# Patient Record
Sex: MALE | Race: BLACK OR AFRICAN AMERICAN | NOT HISPANIC OR LATINO | Employment: FULL TIME | URBAN - METROPOLITAN AREA
[De-identification: names, ages, dates, MRNs, and addresses within clinical notes are randomized per-mention and may not be internally consistent; named-entity substitution may affect disease eponyms.]

---

## 2018-08-28 ENCOUNTER — HISTORICAL (OUTPATIENT)
Dept: ADMINISTRATIVE | Facility: HOSPITAL | Age: 23
End: 2018-08-28

## 2022-12-11 ENCOUNTER — HOSPITAL ENCOUNTER (EMERGENCY)
Facility: HOSPITAL | Age: 27
Discharge: HOME | End: 2022-12-11
Attending: STUDENT IN AN ORGANIZED HEALTH CARE EDUCATION/TRAINING PROGRAM
Payer: MEDICAID

## 2022-12-11 VITALS
WEIGHT: 235 LBS | DIASTOLIC BLOOD PRESSURE: 81 MMHG | HEIGHT: 66 IN | HEART RATE: 60 BPM | RESPIRATION RATE: 20 BRPM | BODY MASS INDEX: 37.77 KG/M2 | SYSTOLIC BLOOD PRESSURE: 133 MMHG | TEMPERATURE: 98 F | OXYGEN SATURATION: 98 %

## 2022-12-11 DIAGNOSIS — H69.93 EUSTACHIAN TUBE DYSFUNCTION, BILATERAL: Primary | ICD-10-CM

## 2022-12-11 PROCEDURE — 99283 EMERGENCY DEPT VISIT LOW MDM: CPT | Mod: 25

## 2022-12-11 RX ORDER — FLUTICASONE PROPIONATE 50 MCG
1 SPRAY, SUSPENSION (ML) NASAL DAILY
Qty: 15 G | Refills: 0 | Status: SHIPPED | OUTPATIENT
Start: 2022-12-11 | End: 2023-06-01 | Stop reason: SDUPTHER

## 2022-12-11 RX ORDER — CETIRIZINE HYDROCHLORIDE 10 MG/1
10 TABLET ORAL DAILY
Qty: 30 TABLET | Refills: 0 | OUTPATIENT
Start: 2022-12-11 | End: 2023-06-01

## 2022-12-11 NOTE — ED PROVIDER NOTES
Encounter Date: 12/11/2022       History     Chief Complaint   Patient presents with    Otalgia     Ear pain for two weeks     HPI    27-year-old male with no known past medical history presents emergency department for ear pain.  Patient states he has been having intermittent ear pain and ear fullness for last 2 weeks.  States he feels like he is in a bubble.  States he tried some wax drops pharmacy with no improvement.  No fevers.  Mild sinus congestion from seasonal allergies.  No cough.    Review of patient's allergies indicates:  No Known Allergies  History reviewed. No pertinent past medical history.  History reviewed. No pertinent surgical history.  History reviewed. No pertinent family history.     Review of Systems   Constitutional:  Negative for fever.   HENT:  Positive for congestion, ear pain and postnasal drip.    Respiratory:  Negative for cough.    Cardiovascular:  Negative for chest pain.   Gastrointestinal:  Negative for abdominal pain.   Neurological:  Positive for headaches.   All other systems reviewed and are negative.    Physical Exam     Initial Vitals [12/11/22 0636]   BP Pulse Resp Temp SpO2   133/81 60 20 98.2 °F (36.8 °C) 98 %      MAP       --         Physical Exam    Nursing note and vitals reviewed.  Constitutional: He appears well-developed and well-nourished. No distress.   HENT:   Mouth/Throat: Oropharynx is clear and moist.   Bilateral middle ear effusion with no erythema of TM   Cardiovascular:  Normal rate, regular rhythm and intact distal pulses.           Pulmonary/Chest: Breath sounds normal. No respiratory distress. He has no wheezes. He has no rhonchi.   Abdominal: Abdomen is soft. Bowel sounds are normal. There is no abdominal tenderness.   Musculoskeletal:         General: Normal range of motion.     Neurological: He is alert and oriented to person, place, and time. GCS score is 15. GCS eye subscore is 4. GCS verbal subscore is 5. GCS motor subscore is 6.   Skin: Skin is  warm. Capillary refill takes less than 2 seconds.   Psychiatric: He has a normal mood and affect. Thought content normal.       ED Course   Procedures  Labs Reviewed - No data to display       Imaging Results    None          Medications - No data to display  Medical Decision Making:   Differential Diagnosis:   Otitis media, otitis externa, middle ear effusion, foreign body ear, eustachian tube dysfunction                        Clinical Impression:   Final diagnoses:  [H69.83] Eustachian tube dysfunction, bilateral (Primary)        ED Disposition Condition    Discharge Stable          ED Prescriptions       Medication Sig Dispense Start Date End Date Auth. Provider    fluticasone propionate (FLONASE) 50 mcg/actuation nasal spray 1 spray (50 mcg total) by Each Nostril route once daily. 15 g 12/11/2022 -- Uriel Villalpando MD    cetirizine (ZYRTEC) 10 MG tablet Take 1 tablet (10 mg total) by mouth once daily. 30 tablet 12/11/2022 12/11/2023 Uriel Villalpando MD          Follow-up Information       Follow up With Specialties Details Why Contact Info    [facility] - Emergency Dept Emergency Medicine Go to  If symptoms worsen [id number]  [facility] [id number] [phone]             Uriel Villalpando MD  12/11/22 2252

## 2023-06-01 ENCOUNTER — HOSPITAL ENCOUNTER (EMERGENCY)
Facility: HOSPITAL | Age: 28
Discharge: HOME | End: 2023-06-01
Attending: EMERGENCY MEDICINE
Payer: MEDICAID

## 2023-06-01 VITALS
WEIGHT: 213 LBS | TEMPERATURE: 98 F | SYSTOLIC BLOOD PRESSURE: 167 MMHG | BODY MASS INDEX: 35.49 KG/M2 | DIASTOLIC BLOOD PRESSURE: 98 MMHG | RESPIRATION RATE: 20 BRPM | OXYGEN SATURATION: 98 % | HEART RATE: 107 BPM | HEIGHT: 65 IN

## 2023-06-01 DIAGNOSIS — H65.191 ACUTE MIDDLE EAR EFFUSION, RIGHT: Primary | ICD-10-CM

## 2023-06-01 PROCEDURE — 25000003 PHARM REV CODE 250: Performed by: PHYSICIAN ASSISTANT

## 2023-06-01 PROCEDURE — 99284 EMERGENCY DEPT VISIT MOD MDM: CPT

## 2023-06-01 RX ORDER — FLUTICASONE PROPIONATE 50 MCG
1 SPRAY, SUSPENSION (ML) NASAL DAILY
Qty: 15 G | Refills: 0 | Status: SHIPPED | OUTPATIENT
Start: 2023-06-01

## 2023-06-01 RX ORDER — CETIRIZINE HYDROCHLORIDE 1 MG/ML
10 SOLUTION ORAL DAILY
Qty: 300 ML | Refills: 0 | Status: SHIPPED | OUTPATIENT
Start: 2023-06-01 | End: 2023-07-01

## 2023-06-01 RX ORDER — TRIPROLIDINE/PSEUDOEPHEDRINE 2.5MG-60MG
600 TABLET ORAL
Status: COMPLETED | OUTPATIENT
Start: 2023-06-01 | End: 2023-06-01

## 2023-06-01 RX ORDER — IBUPROFEN 600 MG/1
600 TABLET ORAL EVERY 6 HOURS PRN
Qty: 20 TABLET | Refills: 0 | Status: SHIPPED | OUTPATIENT
Start: 2023-06-01

## 2023-06-01 RX ADMIN — IBUPROFEN 600 MG: 100 SUSPENSION ORAL at 06:06

## 2023-06-01 NOTE — DISCHARGE INSTRUCTIONS
Hydrate with plenty of water. Tylenol and ibuprofen in rotation for fever/aches. Use allergy medication and Flonase daily. May use over the counter cough syrup.

## 2023-06-01 NOTE — Clinical Note
"Jerry"Juanita Suarez was seen and treated in our emergency department on 6/1/2023.  He may return to work on 06/03/2023.       If you have any questions or concerns, please don't hesitate to call.      ZANDRA Gardner"

## 2023-06-01 NOTE — ED PROVIDER NOTES
Encounter Date: 6/1/2023       History     Chief Complaint   Patient presents with    Otalgia     Pt to er c/o pain to right ear onset this morning.     27-year-old male with no known past medical history presents emergency department for ear pain.  Patient states he has been having intermittent ear pain and ear fullness since 0900 this morning.  States he feels like he is in a bubble and his ear has been popping. Intermittent cough. Denies any fever or drainage.     The history is provided by the patient. No  was used.   Review of patient's allergies indicates:  No Known Allergies  History reviewed. No pertinent past medical history.  Past Surgical History:   Procedure Laterality Date    tubes in ears Bilateral      No family history on file.     Review of Systems   Constitutional:  Negative for fatigue and fever.   HENT:  Positive for ear pain. Negative for congestion, rhinorrhea and sore throat.    Respiratory:  Positive for cough. Negative for shortness of breath and wheezing.    Cardiovascular:  Negative for chest pain.   Gastrointestinal:  Negative for abdominal pain, nausea and vomiting.   Musculoskeletal:  Negative for myalgias.   Neurological:  Negative for headaches.   All other systems reviewed and are negative.    Physical Exam     Initial Vitals [06/01/23 1723]   BP Pulse Resp Temp SpO2   (!) 167/98 107 20 97.7 °F (36.5 °C) 98 %      MAP       --         Physical Exam    Nursing note and vitals reviewed.  Constitutional: He appears well-developed. He is cooperative.   HENT:   Head: Normocephalic and atraumatic.   Right Ear: External ear and ear canal normal. Tympanic membrane is not injected, not perforated, not erythematous and not bulging. A middle ear effusion is present.   Left Ear: Tympanic membrane, external ear and ear canal normal.   Mouth/Throat: Uvula is midline, oropharynx is clear and moist and mucous membranes are normal. No trismus in the jaw. No uvula swelling. No  oropharyngeal exudate, posterior oropharyngeal edema or posterior oropharyngeal erythema.   Eyes: Conjunctivae are normal. Pupils are equal, round, and reactive to light.   Neck: Neck supple.   Normal range of motion.  Cardiovascular:  Normal rate, regular rhythm and normal heart sounds.           Pulmonary/Chest: Breath sounds normal. No respiratory distress. He has no wheezes. He has no rhonchi. He has no rales.   Abdominal: Abdomen is soft. Bowel sounds are normal. There is no abdominal tenderness.   Musculoskeletal:         General: Normal range of motion.      Cervical back: Normal range of motion and neck supple.     Neurological: He is alert and oriented to person, place, and time.   Skin: Skin is warm and dry. Capillary refill takes less than 2 seconds.   Psychiatric: He has a normal mood and affect.       ED Course   Procedures  Labs Reviewed - No data to display       Imaging Results    None          Medications   ibuprofen 20 mg/mL oral liquid 600 mg (has no administration in time range)     Medical Decision Making:   Initial Assessment:   27-year-old male with no known past medical history presents emergency department for ear pain.  Patient states he has been having intermittent ear pain and ear fullness since 0900 this morning.  States he feels like he is in a bubble and his ear has been popping. Intermittent cough. Denies any fever or drainage  Differential Diagnosis:   Viral syndrome, COVID, flu, sinusitis, OM, OE, allergies    ED Management:  Patient afebrile and in no acute distress.  Presents to ED for evaluation of right ear pain and feeling like he was in a bubble.  Reports intermittent cough.  On exam patient with right middle ear effusion without any signs of infection.  Discussed possible viral in nature.  Offered patient COVID/flu testing due to cough.  Patient declined at this time stating that he can not wait for results.  Will treat with short course of allergy medication, NSAIDs and  Flonase.  Return ED precautions given.  Patient verbalizes understanding and agrees with plan of care.                        Clinical Impression:   Final diagnoses:  [H65.191] Acute middle ear effusion, right (Primary)        ED Disposition Condition    Discharge Stable          ED Prescriptions       Medication Sig Dispense Start Date End Date Auth. Provider    fluticasone propionate (FLONASE) 50 mcg/actuation nasal spray 1 spray (50 mcg total) by Each Nostril route once daily. 15 g 6/1/2023 -- ZANDRA Gardner    cetirizine (ZYRTEC) 1 mg/mL syrup Take 10 mLs (10 mg total) by mouth once daily. 300 mL 6/1/2023 7/1/2023 ZANDRA Gardner    ibuprofen (ADVIL,MOTRIN) 600 MG tablet Take 1 tablet (600 mg total) by mouth every 6 (six) hours as needed for Pain. 20 tablet 6/1/2023 -- ZANDRA Gardner          Follow-up Information       Follow up With Specialties Details Why Contact Info    PCP  In 1 week As needed, If you do not have a PCP you may call [phone] to help get set up If you do not have a PCP you may call [phone] to help get set up.             ZANDRA Gardner  06/01/23 9743

## 2024-06-22 ENCOUNTER — HOSPITAL ENCOUNTER (EMERGENCY)
Facility: HOSPITAL | Age: 29
Discharge: HOME | End: 2024-06-22
Attending: EMERGENCY MEDICINE

## 2024-06-22 VITALS
HEART RATE: 65 BPM | OXYGEN SATURATION: 97 % | WEIGHT: 210 LBS | TEMPERATURE: 98 F | SYSTOLIC BLOOD PRESSURE: 130 MMHG | HEIGHT: 64 IN | DIASTOLIC BLOOD PRESSURE: 84 MMHG | RESPIRATION RATE: 18 BRPM | BODY MASS INDEX: 35.85 KG/M2

## 2024-06-22 DIAGNOSIS — J32.9 SINUSITIS, UNSPECIFIED CHRONICITY, UNSPECIFIED LOCATION: ICD-10-CM

## 2024-06-22 DIAGNOSIS — J06.9 VIRAL URI WITH COUGH: ICD-10-CM

## 2024-06-22 DIAGNOSIS — H66.91 RIGHT OTITIS MEDIA, UNSPECIFIED OTITIS MEDIA TYPE: Primary | ICD-10-CM

## 2024-06-22 LAB
FLUAV AG UPPER RESP QL IA.RAPID: NOT DETECTED
FLUBV AG UPPER RESP QL IA.RAPID: NOT DETECTED
SARS-COV-2 RNA RESP QL NAA+PROBE: NOT DETECTED
STREP A PCR (OHS): NOT DETECTED

## 2024-06-22 PROCEDURE — 0240U COVID/FLU A&B PCR: CPT | Performed by: NURSE PRACTITIONER

## 2024-06-22 PROCEDURE — 99284 EMERGENCY DEPT VISIT MOD MDM: CPT | Mod: 25

## 2024-06-22 PROCEDURE — 63600175 PHARM REV CODE 636 W HCPCS: Performed by: NURSE PRACTITIONER

## 2024-06-22 PROCEDURE — 87651 STREP A DNA AMP PROBE: CPT | Performed by: NURSE PRACTITIONER

## 2024-06-22 PROCEDURE — 96372 THER/PROPH/DIAG INJ SC/IM: CPT | Performed by: NURSE PRACTITIONER

## 2024-06-22 RX ORDER — AMOXICILLIN AND CLAVULANATE POTASSIUM 400; 57 MG/5ML; MG/5ML
875 POWDER, FOR SUSPENSION ORAL 2 TIMES DAILY
Qty: 220 ML | Refills: 0 | Status: SHIPPED | OUTPATIENT
Start: 2024-06-22 | End: 2024-07-02

## 2024-06-22 RX ORDER — DEXAMETHASONE SODIUM PHOSPHATE 4 MG/ML
8 INJECTION, SOLUTION INTRA-ARTICULAR; INTRALESIONAL; INTRAMUSCULAR; INTRAVENOUS; SOFT TISSUE
Status: COMPLETED | OUTPATIENT
Start: 2024-06-22 | End: 2024-06-22

## 2024-06-22 RX ORDER — CETIRIZINE HYDROCHLORIDE 1 MG/ML
10 SOLUTION ORAL DAILY
Qty: 100 ML | Refills: 0 | Status: SHIPPED | OUTPATIENT
Start: 2024-06-22 | End: 2024-07-02

## 2024-06-22 RX ORDER — FLUTICASONE PROPIONATE 50 MCG
1 SPRAY, SUSPENSION (ML) NASAL 2 TIMES DAILY PRN
Qty: 15 G | Refills: 0 | Status: SHIPPED | OUTPATIENT
Start: 2024-06-22 | End: 2024-06-27

## 2024-06-22 RX ADMIN — DEXAMETHASONE SODIUM PHOSPHATE 8 MG: 4 INJECTION, SOLUTION INTRA-ARTICULAR; INTRALESIONAL; INTRAMUSCULAR; INTRAVENOUS; SOFT TISSUE at 05:06

## 2024-06-22 NOTE — ED PROVIDER NOTES
"Encounter Date: 6/22/2024       History     Chief Complaint   Patient presents with    Cough     Pt complaint of a cough not relieved with OTC for the past week (2) "popping with drainage" to both ears x 2 weeks     Patient states coughing, congestion, sore throat, and ear pain x 2 weeks. Denies any fever, ear drainage, SOB, wheezing, nausea, or vomiting. Denies any PMH.    The history is provided by the patient.   Sinusitis   This is a recurrent problem. Illness onset: 2 weeks. The problem has been unchanged. The pain has been Intermittent since onset. Associated symptoms include congestion, sinus pressure, sore throat and cough. Pertinent negatives include no chills.     Review of patient's allergies indicates:  No Known Allergies  No past medical history on file.  Past Surgical History:   Procedure Laterality Date    tubes in ears Bilateral      No family history on file.     Review of Systems   Constitutional: Negative.  Negative for chills and fever.   HENT:  Positive for congestion, rhinorrhea, sinus pressure and sore throat.    Eyes: Negative.    Respiratory:  Positive for cough.    Cardiovascular: Negative.    Gastrointestinal: Negative.    Endocrine: Negative.    Genitourinary: Negative.    Musculoskeletal: Negative.    Skin: Negative.    Allergic/Immunologic: Negative.    Neurological: Negative.    Hematological: Negative.    Psychiatric/Behavioral: Negative.     All other systems reviewed and are negative.      Physical Exam     Initial Vitals [06/22/24 1557]   BP Pulse Resp Temp SpO2   130/84 65 18 98.4 °F (36.9 °C) 97 %      MAP       --         Physical Exam    Nursing note and vitals reviewed.  Constitutional: He appears well-developed and well-nourished. No distress.   HENT:   Head: Normocephalic and atraumatic.   Right Ear: External ear and ear canal normal. No mastoid tenderness. Tympanic membrane is erythematous and bulging.   Left Ear: Tympanic membrane, external ear and ear canal normal. No " mastoid tenderness.   Mouth/Throat: Uvula is midline, oropharynx is clear and moist and mucous membranes are normal.   Eyes: Conjunctivae and EOM are normal. Pupils are equal, round, and reactive to light.   Neck: Neck supple.   Normal range of motion.  Cardiovascular:  Normal rate, regular rhythm, normal heart sounds and intact distal pulses.           Pulmonary/Chest: Breath sounds normal. No respiratory distress. He has no wheezes. He has no rales.   Abdominal: Abdomen is soft. Bowel sounds are normal. He exhibits no distension. There is no abdominal tenderness.   Musculoskeletal:         General: No edema. Normal range of motion.      Cervical back: Normal range of motion and neck supple.     Lymphadenopathy:     He has no cervical adenopathy.   Neurological: He is alert and oriented to person, place, and time. He has normal strength. Gait normal. GCS score is 15. GCS eye subscore is 4. GCS verbal subscore is 5. GCS motor subscore is 6.   Skin: Skin is warm and dry. No rash noted.   Psychiatric: He has a normal mood and affect. Thought content normal.         ED Course   Procedures  Labs Reviewed   COVID/FLU A&B PCR - Normal    Narrative:     The Xpert Xpress SARS-CoV-2/FLU/RSV plus is a rapid, multiplexed real-time PCR test intended for the simultaneous qualitative detection and differentiation of SARS-CoV-2, Influenza A, Influenza B, and respiratory syncytial virus (RSV) viral RNA in either nasopharyngeal swab or nasal swab specimens.         STREP GROUP A BY PCR - Normal    Narrative:     The Xpert Xpress Strep A test is a rapid, qualitative in vitro diagnostic test for the detection of Streptococcus pyogenes (Group A ß-hemolytic Streptococcus, Strep A) in throat swab specimens from patients with signs and symptoms of pharyngitis.            Imaging Results    None          Medications   dexAMETHasone injection 8 mg (has no administration in time range)     Medical Decision Making  Patient states coughing,  congestion, sore throat, and ear pain x 2 weeks. Denies any fever, ear drainage, SOB, wheezing, nausea, or vomiting. Denies any PMH.    The history is provided by the patient.   Sinusitis   This is a recurrent problem. Illness onset: 2 weeks. The problem has been unchanged. The pain has been Intermittent since onset. Associated symptoms include congestion, sinus pressure, sore throat and cough. Pertinent negatives include no chills.       Amount and/or Complexity of Data Reviewed  Labs: ordered. Decision-making details documented in ED Course.  Discussion of management or test interpretation with external provider(s): Differential diagnosis (including but not limited to):   Judging by the patient's chief complaint and pertinent history, the patient has the following possible differential diagnoses, including but not limited to the following.  Some of these are deemed to be lower likelihood and some more likely based on my physical exam and history combined with possible lab work and/or imaging studies.   Please see the pertinent studies, and refer to the HPI.  Some of these diagnoses will take further evaluation to fully rule out, perhaps as an outpatient and the patient was encouraged to follow up when discharged for more comprehensive evaluation.  Covid, Flu, Strep, URI, Viral Illness, Sinusitis, Otitis Media.   Patient is negative for Covid, Flu, and Strep in the ED. Will treat patient symptomatically for a Viral URI and will treat right otitis media with Augmentin. Patient was prescribed Augmentin for an ear infection x 2 weeks ago but he did not fill his prescription. ED return precautions given.                  ED Course as of 06/22/24 1707   Sat Jun 22, 2024   1641 Strep Group A by PCR [AB]   1641 STREP A PCR ([facility]): Not Detected [AB]   1657 COVID/FLU A&B PCR [AB]   1657 Influenza A, Molecular: Not Detected [AB]   1657 Influenza B, Molecular: Not Detected [AB]   1657 SARS-CoV2 (COVID-19) Qualitative PCR: Not  Detected [AB]      ED Course User Index  [AB] Naomi Berumen FNP                           Clinical Impression:  Final diagnoses:  [H66.91] Right otitis media, unspecified otitis media type (Primary)  [J32.9] Sinusitis, unspecified chronicity, unspecified location  [J06.9] Viral URI with cough          ED Disposition Condition    Discharge Stable          ED Prescriptions       Medication Sig Dispense Start Date End Date Auth. Provider    fluticasone propionate (FLONASE) 50 mcg/actuation nasal spray 1 spray (50 mcg total) by Each Nostril route 2 (two) times daily as needed for Rhinitis. 15 g 6/22/2024 6/27/2024 Naomi Berumen FNP    amoxicillin-clavulanate (AUGMENTIN) 400-57 mg/5 mL SusR Take 10.9 mLs (872 mg total) by mouth 2 (two) times daily. for 10 days 220 mL 6/22/2024 7/2/2024 Naomi Berumen FNP    cetirizine (ZYRTEC) 1 mg/mL syrup Take 10 mLs (10 mg total) by mouth once daily. for 10 days 100 mL 6/22/2024 7/2/2024 Naomi Berumen FNP          Follow-up Information       Follow up With Specialties Details Why Contact Info    Primary Care Provider  In 3 days      Please call [phone] to arrange a follow-up appointment with a Primary Care Provider.  In 3 days               Naomi Berumen FNP  06/22/24 5148

## 2024-06-22 NOTE — ED TRIAGE NOTES
"  Pt complaint of a cough not relieved with OTC for the past week (2) "popping with drainage" to both ears x 2 weeks  "

## 2025-01-09 ENCOUNTER — OFFICE VISIT (OUTPATIENT)
Dept: URGENT CARE | Facility: CLINIC | Age: 30
End: 2025-01-09
Payer: COMMERCIAL

## 2025-01-09 VITALS
OXYGEN SATURATION: 98 % | TEMPERATURE: 101 F | WEIGHT: 219 LBS | DIASTOLIC BLOOD PRESSURE: 86 MMHG | SYSTOLIC BLOOD PRESSURE: 135 MMHG | HEART RATE: 117 BPM | BODY MASS INDEX: 37.39 KG/M2 | RESPIRATION RATE: 18 BRPM | HEIGHT: 64 IN

## 2025-01-09 DIAGNOSIS — R09.89 SYMPTOMS OF UPPER RESPIRATORY INFECTION (URI): ICD-10-CM

## 2025-01-09 DIAGNOSIS — J10.1 INFLUENZA A: Primary | ICD-10-CM

## 2025-01-09 DIAGNOSIS — R50.9 FEVER, UNSPECIFIED FEVER CAUSE: ICD-10-CM

## 2025-01-09 LAB
CTP QC/QA: YES
MOLECULAR STREP A: NEGATIVE
POC MOLECULAR INFLUENZA A AGN: POSITIVE
POC MOLECULAR INFLUENZA B AGN: NEGATIVE
SARS-COV-2 AG RESP QL IA.RAPID: NEGATIVE

## 2025-01-09 PROCEDURE — 87502 INFLUENZA DNA AMP PROBE: CPT | Mod: PBBFAC

## 2025-01-09 PROCEDURE — 87811 SARS-COV-2 COVID19 W/OPTIC: CPT | Mod: PBBFAC

## 2025-01-09 PROCEDURE — 99214 OFFICE O/P EST MOD 30 MIN: CPT | Mod: PBBFAC

## 2025-01-09 PROCEDURE — 87651 STREP A DNA AMP PROBE: CPT | Mod: PBBFAC

## 2025-01-09 PROCEDURE — 25000003 PHARM REV CODE 250

## 2025-01-09 PROCEDURE — 99204 OFFICE O/P NEW MOD 45 MIN: CPT | Mod: S$PBB,,,

## 2025-01-09 RX ORDER — IBUPROFEN 600 MG/1
600 TABLET ORAL
Status: COMPLETED | OUTPATIENT
Start: 2025-01-09 | End: 2025-01-09

## 2025-01-09 RX ORDER — CETIRIZINE HYDROCHLORIDE 10 MG/1
10 TABLET ORAL DAILY PRN
Qty: 30 TABLET | Refills: 0 | Status: SHIPPED | OUTPATIENT
Start: 2025-01-09

## 2025-01-09 RX ORDER — IBUPROFEN 800 MG/1
800 TABLET ORAL EVERY 8 HOURS PRN
Qty: 20 TABLET | Refills: 0 | Status: SHIPPED | OUTPATIENT
Start: 2025-01-09

## 2025-01-09 RX ORDER — OSELTAMIVIR PHOSPHATE 75 MG/1
75 CAPSULE ORAL 2 TIMES DAILY
Qty: 10 CAPSULE | Refills: 0 | Status: SHIPPED | OUTPATIENT
Start: 2025-01-09 | End: 2025-01-14

## 2025-01-09 RX ORDER — PROMETHAZINE HYDROCHLORIDE AND DEXTROMETHORPHAN HYDROBROMIDE 6.25; 15 MG/5ML; MG/5ML
5 SYRUP ORAL EVERY 6 HOURS PRN
Qty: 118 ML | Refills: 0 | Status: SHIPPED | OUTPATIENT
Start: 2025-01-09 | End: 2025-01-19

## 2025-01-09 RX ADMIN — IBUPROFEN 600 MG: 600 TABLET, FILM COATED ORAL at 10:01

## 2025-01-09 NOTE — LETTER
January 9, 2025      [facility]  [facility] [facility] [address]  Phone: [phone]       Patient: Jerry Suarez   YOB: 1995  Date of Visit: 01/09/2025    To Whom It May Concern:    Jerri Suarez  was at [facility] on 01/09/2025. The patient may return to work/school on 01/13/2025 with no restrictions. If you have any questions or concerns, or if I can be of further assistance, please do not hesitate to contact me.    Sincerely,    BLANCA Nicole

## 2025-01-09 NOTE — PROGRESS NOTES
"Subjective:       Patient ID: Jerry Suarez Jr. is a 29 y.o. male.    Vitals:  height is 5' 4" (1.626 m) and weight is 99.3 kg (219 lb). His oral temperature is 100.6 °F (38.1 °C) (abnormal). His blood pressure is 135/86 and his pulse is 117 (abnormal). His respiration is 18 and oxygen saturation is 98%.     Chief Complaint: URI (Cough, nasal congestion, body aches, chills, fever and sore throat x 1 day)    30 y/o AAM presents to  alone, he is c/o symptoms onset yesterday, has taken cough drops with nasal spray with minimal improvement, he denies any ill contacts.         Constitution: Positive for chills, fatigue and fever.   HENT:  Positive for congestion and sore throat.    Respiratory:  Positive for cough. Negative for shortness of breath, wheezing and asthma.    Gastrointestinal:  Negative for abdominal pain, nausea and vomiting.   Musculoskeletal:  Positive for muscle ache.   Allergic/Immunologic: Negative for asthma.   Neurological:  Negative for headaches.       Objective:      Physical Exam   Constitutional: He is oriented to person, place, and time. He is cooperative. He is easily aroused.  Non-toxic appearance. He appears ill. overweightawake  HENT:   Head: Normocephalic and atraumatic.   Ears:   Right Ear: impacted cerumen  Left Ear: impacted cerumen  Nose: Mucosal edema and rhinorrhea present.   Mouth/Throat: Uvula is midline and mucous membranes are normal. Posterior oropharyngeal erythema present.   Eyes: Lids are normal.   Cardiovascular: Tachycardia present.   Pulmonary/Chest: Effort normal and breath sounds normal.   Neurological: He is alert, oriented to person, place, and time and easily aroused. Gait normal. GCS eye subscore is 4. GCS verbal subscore is 5. GCS motor subscore is 6.   Skin: Skin is warm, dry, intact and not diaphoretic. Capillary refill takes less than 2 seconds.   Psychiatric: His speech is normal and behavior is normal.   Nursing note and vitals reviewed.      "   Assessment:       1. Influenza A    2. Symptoms of upper respiratory infection (URI)    3. Fever, unspecified fever cause          Plan:     Patient has tested positive for Flu A, droplet transmission precautions discussed, encouraged patient to alternate Tylenol and Motrin as needed, when to seek ER attention discussed.    Influenza A  -     oseltamivir (TAMIFLU) 75 MG capsule; Take 1 capsule (75 mg total) by mouth 2 (two) times daily. for 5 days  Dispense: 10 capsule; Refill: 0    Symptoms of upper respiratory infection (URI)  -     SARS Coronavirus 2 Antigen, POCT Manual Read  -     POCT Strep A, Molecular  -     POCT Influenza A/B MOLECULAR  -     promethazine-dextromethorphan (PROMETHAZINE-DM) 6.25-15 mg/5 mL Syrp; Take 5 mLs by mouth every 6 (six) hours as needed (cough).  Dispense: 118 mL; Refill: 0  -     cetirizine (ZYRTEC) 10 MG tablet; Take 1 tablet (10 mg total) by mouth daily as needed for Allergies.  Dispense: 30 tablet; Refill: 0    Fever, unspecified fever cause  -     ibuprofen tablet 600 mg  -     ibuprofen (ADVIL,MOTRIN) 800 MG tablet; Take 1 tablet (800 mg total) by mouth every 8 (eight) hours as needed for Pain.  Dispense: 20 tablet; Refill: 0           Results for orders placed or performed in visit on 01/09/25   POCT Strep A, Molecular    Collection Time: 01/09/25  9:55 AM   Result Value Ref Range    Molecular Strep A, POC Negative Negative     Acceptable Yes    POCT Influenza A/B MOLECULAR    Collection Time: 01/09/25  9:55 AM   Result Value Ref Range    POC Molecular Influenza A Ag Positive (A) Negative    POC Molecular Influenza B Ag Negative Negative     Acceptable Yes    SARS Coronavirus 2 Antigen, POCT Manual Read    Collection Time: 01/09/25  9:56 AM   Result Value Ref Range    SARS Coronavirus 2 Antigen Negative Negative     Acceptable Yes

## 2025-05-27 ENCOUNTER — OFFICE VISIT (OUTPATIENT)
Dept: URGENT CARE | Facility: CLINIC | Age: 30
End: 2025-05-27
Payer: COMMERCIAL

## 2025-05-27 VITALS
HEART RATE: 93 BPM | DIASTOLIC BLOOD PRESSURE: 86 MMHG | OXYGEN SATURATION: 97 % | BODY MASS INDEX: 37.56 KG/M2 | HEIGHT: 64 IN | WEIGHT: 220 LBS | SYSTOLIC BLOOD PRESSURE: 126 MMHG | RESPIRATION RATE: 18 BRPM | TEMPERATURE: 98 F

## 2025-05-27 DIAGNOSIS — H66.93 BILATERAL OTITIS MEDIA, UNSPECIFIED OTITIS MEDIA TYPE: Primary | ICD-10-CM

## 2025-05-27 PROCEDURE — 99213 OFFICE O/P EST LOW 20 MIN: CPT | Mod: S$PBB,,, | Performed by: FAMILY MEDICINE

## 2025-05-27 PROCEDURE — 99214 OFFICE O/P EST MOD 30 MIN: CPT | Mod: PBBFAC | Performed by: FAMILY MEDICINE

## 2025-05-27 RX ORDER — AMOXICILLIN AND CLAVULANATE POTASSIUM 875; 125 MG/1; MG/1
1 TABLET, FILM COATED ORAL 2 TIMES DAILY
Qty: 20 TABLET | Refills: 0 | Status: SHIPPED | OUTPATIENT
Start: 2025-05-27 | End: 2025-06-06

## 2025-05-27 NOTE — PROGRESS NOTES
"Subjective:       Patient ID: Jerry Suarez Jr. is a 29 y.o. male.    Vitals:  height is 5' 4" (1.626 m) and weight is 99.8 kg (220 lb). His oral temperature is 98 °F (36.7 °C). His blood pressure is 126/86 and his pulse is 93. His respiration is 18 and oxygen saturation is 97%.     Chief Complaint: Ear Fullness (Lt sided ear pain, popping, wax build up x 3 days  )    Patient with several days of worsening bilateral ear discomfort, occasional popping, no leaking.  No fever.  No cardiorespiratory symptoms.    All other systems are negative    Chart reviewed    Objective:   Physical Exam   Constitutional: He appears well-developed.  Non-toxic appearance. He does not appear ill. No distress.   HENT:   Head: Atraumatic.   Ears:   Right Ear: No mastoid tenderness. Tympanic membrane is erythematous.   Left Ear: No mastoid tenderness. Tympanic membrane is erythematous.   Nose: No purulent discharge. Right sinus exhibits no maxillary sinus tenderness and no frontal sinus tenderness. Left sinus exhibits no maxillary sinus tenderness and no frontal sinus tenderness.   Mouth/Throat: Uvula is midline.   Eyes: Right eye exhibits no discharge. Left eye exhibits no discharge. Extraocular movement intact   Neck: Neck supple. No neck rigidity present.   Cardiovascular: Normal rate.   Pulmonary/Chest: Effort normal. No respiratory distress.   Lymphadenopathy:     He has no cervical adenopathy.   Neurological: He is alert.   Skin: Skin is warm, dry and not diaphoretic.   Psychiatric: His behavior is normal.   Nursing note and vitals reviewed.        Assessment:     1. Bilateral otitis media, unspecified otitis media type            Plan:   Will treat as early bilateral otitis media.  A course of Augmentin.  Avoid over-the-counter medications in ears.  Monitor and return if not improving in several days, sooner if new or worsening symptoms develop.      Bilateral otitis media, unspecified otitis media type  -     " amoxicillin-clavulanate 875-125mg (AUGMENTIN) 875-125 mg per tablet; Take 1 tablet by mouth 2 (two) times daily. for 10 days  Dispense: 20 tablet; Refill: 0        Portions of this report were dictated using voice recognition software. Content is subject to voice recognition errors

## 2025-08-22 ENCOUNTER — OFFICE VISIT (OUTPATIENT)
Dept: URGENT CARE | Facility: CLINIC | Age: 30
End: 2025-08-22
Payer: COMMERCIAL

## 2025-08-22 VITALS
BODY MASS INDEX: 38.13 KG/M2 | DIASTOLIC BLOOD PRESSURE: 85 MMHG | SYSTOLIC BLOOD PRESSURE: 123 MMHG | HEART RATE: 81 BPM | OXYGEN SATURATION: 94 % | RESPIRATION RATE: 18 BRPM | WEIGHT: 223.38 LBS | HEIGHT: 64 IN

## 2025-08-22 DIAGNOSIS — H69.90 DYSFUNCTION OF EUSTACHIAN TUBE, UNSPECIFIED LATERALITY: Primary | ICD-10-CM

## 2025-08-22 PROCEDURE — 99214 OFFICE O/P EST MOD 30 MIN: CPT | Mod: PBBFAC | Performed by: FAMILY MEDICINE

## 2025-08-22 RX ORDER — FLUTICASONE PROPIONATE 50 MCG
2 SPRAY, SUSPENSION (ML) NASAL DAILY
Qty: 18.2 ML | Refills: 1 | Status: SHIPPED | OUTPATIENT
Start: 2025-08-22 | End: 2025-08-22

## 2025-08-22 RX ORDER — FLUTICASONE PROPIONATE 50 MCG
2 SPRAY, SUSPENSION (ML) NASAL DAILY
Qty: 18.2 ML | Refills: 1 | Status: SHIPPED | OUTPATIENT
Start: 2025-08-22